# Patient Record
(demographics unavailable — no encounter records)

---

## 2024-11-07 NOTE — DISCUSSION/SUMMARY
[Obstructive Sleep Apnea] : obstructive sleep apnea [Moderate] : moderate in severity [None] : There are no changes in medication management [Alcohol Avoidance] : alcohol avoidance [Sedative Avoidance] : sedative avoidance [Weight Loss Program] : weight loss program [Patient] : discussed with the patient [de-identified] : P30i [FreeTextEntry1] : The pathophysiology of sleep was explained to the patient in detail. Inclusive of this was the reasoning behind and the expected response to positive airway pressure therapy. Compliance was outlined including further followup  CPAP was initiated with full explanation of the physiology behind treatment, compliance requirements and care of equipment.

## 2024-11-07 NOTE — HISTORY OF PRESENT ILLNESS
[Obstructive Sleep Apnea] : obstructive sleep apnea [Awakes Unrefreshed] : awakes unrefreshed [Awakes with Dry Mouth] : awakes with dry mouth [Daytime Somnolence] : daytime somnolence [Nonrestorative Sleep] : nonrestorative sleep [Snoring] : snoring [Home] : home [Awakes with Headache] : does not awaken with headache [Tired while Driving] : not tired while driving [Witnessed Apneas] : no witnessed apneas [TextBox_77] : 8218 [TextBox_79] : 8918 [TextBox_81] : 10 [TextBox_89] : 0 [TextBox_100] : 8/20/2024 [TextBox_108] : 19.0 [TextBox_112] : 4.5min [TextBox_116] : 81 [TextBox_120] : TST 553min, REM26.7 [TextBox_127] : 8/20/2024 [ESS] : 7

## 2024-11-07 NOTE — CONSULT LETTER
[Dear  ___] : Dear  [unfilled], [Consult Letter:] : I had the pleasure of evaluating your patient, [unfilled]. [Please see my note below.] : Please see my note below. [Consult Closing:] : Thank you very much for allowing me to participate in the care of this patient.  If you have any questions, please do not hesitate to contact me. [Sincerely,] : Sincerely, [FreeTextEntry3] : Gary Haddad MD FCCP Pulmonary/Critical Care/Sleep Medicine Department of Internal Medicine  [facility]

## 2025-02-10 NOTE — HISTORY OF PRESENT ILLNESS
[Obstructive Sleep Apnea] : obstructive sleep apnea [Awakes Unrefreshed] : awakes unrefreshed [Awakes with Dry Mouth] : awakes with dry mouth [Daytime Somnolence] : daytime somnolence [Nonrestorative Sleep] : nonrestorative sleep [Snoring] : snoring [Home] : home [Awakes with Headache] : does not awaken with headache [Tired while Driving] : not tired while driving [Witnessed Apneas] : no witnessed apneas [APAP:] : APAP [TextBox_77] : 3470 [TextBox_79] : 4750 [TextBox_81] : 10 [TextBox_89] : 0 [TextBox_100] : 8/20/2024 [TextBox_108] : 19.0 [TextBox_112] : 4.5min [TextBox_116] : 81 [TextBox_120] : TST 553min, REM26.7 [TextBox_125] : 4-16 [TextBox_127] : 1/2/2025 [TextBox_129] : 1/31/2025 [TextBox_133] : 90 [TextBox_137] : 80 [TextBox_141] : 6 [TextBox_143] : 50 [TextBox_147] : 1.2 [TextBox_158] : [employer] [TextBox_160] : P10 [TextBox_162] : 12/12/2024 [ESS] : 5

## 2025-02-10 NOTE — CONSULT LETTER
[Dear  ___] : Dear  [unfilled], [Consult Letter:] : I had the pleasure of evaluating your patient, [unfilled]. [Please see my note below.] : Please see my note below. [Consult Closing:] : Thank you very much for allowing me to participate in the care of this patient.  If you have any questions, please do not hesitate to contact me. [Sincerely,] : Sincerely, [FreeTextEntry3] : Gary Haddad MD FCCP Pulmonary/Critical Care/Sleep Medicine Department of Internal Medicine  [facility]

## 2025-02-10 NOTE — DISCUSSION/SUMMARY
[Obstructive Sleep Apnea] : obstructive sleep apnea [Moderate] : moderate in severity [None] : There are no changes in medication management [Alcohol Avoidance] : alcohol avoidance [Sedative Avoidance] : sedative avoidance [Weight Loss Program] : weight loss program [Patient] : discussed with the patient [Responding to Treatment] : responding to treatment [de-identified] : Patient compliant with CPAP/BiPAP and benefiting from therapy [de-identified] : P30i